# Patient Record
Sex: MALE | Race: WHITE | NOT HISPANIC OR LATINO | URBAN - METROPOLITAN AREA
[De-identification: names, ages, dates, MRNs, and addresses within clinical notes are randomized per-mention and may not be internally consistent; named-entity substitution may affect disease eponyms.]

---

## 2022-06-04 ENCOUNTER — TELEPHONE ENCOUNTER (OUTPATIENT)
Dept: URBAN - METROPOLITAN AREA CLINIC 68 | Facility: CLINIC | Age: 82
End: 2022-06-04

## 2022-06-04 RX ORDER — RIFAXIMIN 550 MG/1
TABLET ORAL
Qty: 42 | Refills: 0 | OUTPATIENT
Start: 2020-05-11 | End: 2020-05-25

## 2022-06-04 RX ORDER — POLYETHYLENE GLYCOL 3350, SODIUM SULFATE ANHYDROUS, SODIUM BICARBONATE, SODIUM CHLORIDE, POTASSIUM CHLORIDE 236; 22.74; 6.74; 5.86; 2.97 G/4L; G/4L; G/4L; G/4L; G/4L
POWDER, FOR SOLUTION ORAL ONCE
Qty: 1 | Refills: 0 | OUTPATIENT
Start: 2020-02-20 | End: 2020-02-21

## 2022-06-05 ENCOUNTER — TELEPHONE ENCOUNTER (OUTPATIENT)
Dept: URBAN - METROPOLITAN AREA CLINIC 68 | Facility: CLINIC | Age: 82
End: 2022-06-05

## 2022-06-05 RX ORDER — GABAPENTIN 400 MG/1
GABAPENTIN( 400MG ORAL   ) ACTIVE -HX ENTRY CAPSULE ORAL
Status: ACTIVE | COMMUNITY
Start: 2020-05-11

## 2022-06-05 RX ORDER — BIOTIN 5 MG
KRILL OIL( 1000MG ORAL   ) ACTIVE -HX ENTRY TABLET ORAL
Status: ACTIVE | COMMUNITY
Start: 2020-05-11

## 2022-06-05 RX ORDER — FAMOTIDINE 20 MG/1
FAMOTIDINE( 20MG ORAL  BID ) ACTIVE -HX ENTRY TABLET, FILM COATED ORAL BID
Status: ACTIVE | COMMUNITY
Start: 2020-05-11

## 2022-06-05 RX ORDER — SIMVASTATIN 40 MG/1
SIMVASTATIN( 40MG ORAL   ) ACTIVE -HX ENTRY TABLET, FILM COATED ORAL
Status: ACTIVE | COMMUNITY
Start: 2020-05-11

## 2022-06-05 RX ORDER — FLAXSEED OIL 1000 MG
FLAX SEED OIL( 1000MG ORAL   ) ACTIVE -HX ENTRY CAPSULE ORAL
Status: ACTIVE | COMMUNITY
Start: 2020-05-11

## 2022-06-05 RX ORDER — VIT A/VIT C/VIT E/ZINC/COPPER 4296-226
PRESERVISION AREDS(  ORAL   ) ACTIVE -HX ENTRY CAPSULE ORAL
Status: ACTIVE | COMMUNITY
Start: 2020-05-11

## 2022-06-25 ENCOUNTER — TELEPHONE ENCOUNTER (OUTPATIENT)
Age: 82
End: 2022-06-25

## 2022-06-25 RX ORDER — POLYETHYLENE GLYCOL 3350, SODIUM SULFATE ANHYDROUS, SODIUM BICARBONATE, SODIUM CHLORIDE, POTASSIUM CHLORIDE 236; 22.74; 6.74; 5.86; 2.97 G/4L; G/4L; G/4L; G/4L; G/4L
POWDER, FOR SOLUTION ORAL ONCE
Qty: 1 | Refills: 0 | OUTPATIENT
Start: 2020-02-20 | End: 2020-02-21

## 2022-06-25 RX ORDER — RIFAXIMIN 550 MG/1
TABLET ORAL
Qty: 42 | Refills: 0 | OUTPATIENT
Start: 2020-05-11 | End: 2020-05-25

## 2022-06-26 ENCOUNTER — TELEPHONE ENCOUNTER (OUTPATIENT)
Age: 82
End: 2022-06-26

## 2022-06-26 RX ORDER — FAMOTIDINE 20 MG/1
FAMOTIDINE( 20MG ORAL  BID ) ACTIVE -HX ENTRY TABLET ORAL BID
Status: ACTIVE | COMMUNITY
Start: 2020-05-11

## 2022-06-26 RX ORDER — VIT A/VIT C/VIT E/ZINC/COPPER 4296-226
PRESERVISION AREDS(  ORAL   ) ACTIVE -HX ENTRY CAPSULE ORAL
Status: ACTIVE | COMMUNITY
Start: 2020-05-11

## 2022-06-26 RX ORDER — SIMVASTATIN 40 MG/1
SIMVASTATIN( 40MG ORAL   ) ACTIVE -HX ENTRY TABLET, FILM COATED ORAL
Status: ACTIVE | COMMUNITY
Start: 2020-05-11

## 2022-06-26 RX ORDER — GABAPENTIN 400 MG/1
GABAPENTIN( 400MG ORAL   ) ACTIVE -HX ENTRY CAPSULE ORAL
Status: ACTIVE | COMMUNITY
Start: 2020-05-11

## 2022-06-26 RX ORDER — FLAXSEED OIL 1000 MG
FLAX SEED OIL( 1000MG ORAL   ) ACTIVE -HX ENTRY CAPSULE ORAL
Status: ACTIVE | COMMUNITY
Start: 2020-05-11

## 2023-03-08 ENCOUNTER — OFFICE VISIT (OUTPATIENT)
Dept: URBAN - METROPOLITAN AREA CLINIC 68 | Facility: CLINIC | Age: 83
End: 2023-03-08

## 2023-03-08 RX ORDER — GABAPENTIN 400 MG/1
GABAPENTIN( 400MG ORAL   ) ACTIVE -HX ENTRY CAPSULE ORAL
Status: ACTIVE | COMMUNITY
Start: 2020-05-11

## 2023-03-08 RX ORDER — POLYETHYLENE GLYCOL 3350, SODIUM SULFATE, SODIUM CHLORIDE, POTASSIUM CHLORIDE, ASCORBIC ACID, SODIUM ASCORBATE 140-9-5.2G
AS DIRECTED KIT ORAL ONCE
Qty: 1 PACKET | Refills: 0 | OUTPATIENT
Start: 2023-03-08 | End: 2023-03-09

## 2023-03-08 RX ORDER — ALPHA-D-GALACTOSIDASE 400 UNIT
1 TABLET AT BEDTIME TABLET ORAL ONCE A DAY
Status: ACTIVE | COMMUNITY

## 2023-03-08 RX ORDER — FLAXSEED OIL 1000 MG
FLAX SEED OIL( 1000MG ORAL   ) ACTIVE -HX ENTRY CAPSULE ORAL
Status: ACTIVE | COMMUNITY
Start: 2020-05-11

## 2023-03-08 RX ORDER — METHOCARBAMOL 500 MG/1
1.5 TABLETS TABLET ORAL
Status: ACTIVE | COMMUNITY

## 2023-03-08 RX ORDER — VIT A/VIT C/VIT E/ZINC/COPPER 4296-226
PRESERVISION AREDS(  ORAL   ) ACTIVE -HX ENTRY CAPSULE ORAL
Status: ACTIVE | COMMUNITY
Start: 2020-05-11

## 2023-03-08 RX ORDER — FAMOTIDINE 20 MG/1
FAMOTIDINE( 20MG ORAL  BID ) ACTIVE -HX ENTRY TABLET ORAL BID
Status: ON HOLD | COMMUNITY
Start: 2020-05-11

## 2023-03-08 RX ORDER — SIMVASTATIN 40 MG/1
SIMVASTATIN( 40MG ORAL   ) ACTIVE -HX ENTRY TABLET, FILM COATED ORAL
Status: ACTIVE | COMMUNITY
Start: 2020-05-11

## 2023-03-08 NOTE — EXAM-MIGRATED EXAMINATIONS
General Examination: Lungs: -> clear to auscultation bilaterally, with good air movement and no rales, rhonchi or wheezes   Breasts: ->    Abdomen: -> soft with good bowel sounds, nontender, and no masses or hepatosplenomegaly   General appearance: -> alert, pleasant, well-nourished and in no acute distress   Head: -> normocephalic, atraumatic   Eyes: -> pupils equal, round, reactive to light and accommodation, sclera anicteric   Ears: -> normal   Oral cavity: -> mucosa moist   Neck / thyroid: ->    Rectal: -> not examined    Extremities: -> normal extremity with no clubbing, cyanosis or edema   Neurologic: -> alert and oriented, normal exam with no motor or sensory deficits   Lymph nodes: ->    Skin: -> skin is warm and dry, with no rashes, good skin turgor and normal hair distribution, with no suspicious skin lesions   Heart: -> regular rate and rhythm without murmurs, gallops, clicks or rubs

## 2023-03-08 NOTE — HPI-MIGRATED HPI
Transition of Care : Patient evaluated due to fecal incontinence, IBS and  colon polyps. Referred having intermittent episodes of incontinence. Referred is currently using fibber supplement on a daily basis.  Denies nausea, vomits, dysphagia, odynophagia, heartburn, abdominal pain, diarrhea, constipation GI bleeding or weight loss

## 2023-03-24 ENCOUNTER — OFFICE VISIT (OUTPATIENT)
Dept: URBAN - METROPOLITAN AREA CLINIC 68 | Facility: CLINIC | Age: 83
End: 2023-03-24

## 2023-04-19 ENCOUNTER — DASHBOARD ENCOUNTERS (OUTPATIENT)
Age: 83
End: 2023-04-19

## 2023-04-19 ENCOUNTER — OFFICE VISIT (OUTPATIENT)
Dept: URBAN - METROPOLITAN AREA SURGERY CENTER 12 | Facility: SURGERY CENTER | Age: 83
End: 2023-04-19

## 2023-04-19 RX ORDER — GABAPENTIN 400 MG/1
GABAPENTIN( 400MG ORAL   ) ACTIVE -HX ENTRY CAPSULE ORAL
Status: ACTIVE | COMMUNITY
Start: 2020-05-11

## 2023-04-19 RX ORDER — VIT A/VIT C/VIT E/ZINC/COPPER 4296-226
PRESERVISION AREDS(  ORAL   ) ACTIVE -HX ENTRY CAPSULE ORAL
Status: ACTIVE | COMMUNITY
Start: 2020-05-11

## 2023-04-19 RX ORDER — SIMVASTATIN 40 MG/1
SIMVASTATIN( 40MG ORAL   ) ACTIVE -HX ENTRY TABLET, FILM COATED ORAL
Status: ACTIVE | COMMUNITY
Start: 2020-05-11

## 2023-04-19 RX ORDER — ALPHA-D-GALACTOSIDASE 400 UNIT
1 TABLET AT BEDTIME TABLET ORAL ONCE A DAY
Status: ACTIVE | COMMUNITY

## 2023-04-19 RX ORDER — FAMOTIDINE 20 MG/1
FAMOTIDINE( 20MG ORAL  BID ) ACTIVE -HX ENTRY TABLET ORAL BID
Status: ON HOLD | COMMUNITY
Start: 2020-05-11

## 2023-04-19 RX ORDER — METHOCARBAMOL 500 MG/1
1.5 TABLETS TABLET ORAL
Status: ACTIVE | COMMUNITY

## 2023-04-19 RX ORDER — FLAXSEED OIL 1000 MG
FLAX SEED OIL( 1000MG ORAL   ) ACTIVE -HX ENTRY CAPSULE ORAL
Status: ACTIVE | COMMUNITY
Start: 2020-05-11

## 2023-04-23 ENCOUNTER — TELEPHONE ENCOUNTER (OUTPATIENT)
Dept: URBAN - METROPOLITAN AREA CLINIC 68 | Facility: CLINIC | Age: 83
End: 2023-04-23

## 2023-08-16 ENCOUNTER — TELEPHONE ENCOUNTER (OUTPATIENT)
Dept: URBAN - METROPOLITAN AREA CLINIC 63 | Facility: CLINIC | Age: 83
End: 2023-08-16